# Patient Record
Sex: MALE | Race: WHITE | Employment: FULL TIME | URBAN - METROPOLITAN AREA
[De-identification: names, ages, dates, MRNs, and addresses within clinical notes are randomized per-mention and may not be internally consistent; named-entity substitution may affect disease eponyms.]

---
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
## 2024-10-01 NOTE — PLAN OF CARE
The [address] [place] [address] [facility], [address] [place] [address], [address] [place] [address]  Phone: [phone]   Fax:     [phone]   Physical Therapy Re-Certification Plan of Care    Dear  ,    We had the pleasure of treating the following patient for physical therapy services at [address] [place] [address]. A summary of our findings can be found in the updated assessment below. This includes our plan of care. If you have any questions or concerns regarding these findings, please do not hesitate to contact me at the office phone number checked above. Thank you for the referral.     Physician Signature:________________________________Date:__________________  By signing above (or electronic signature), therapists plan is approved by physician      Overall Response to Treatment:   [x]Patient is responding well to treatment and improvement is noted with regards  to goals   []Patient should continue to improve in reasonable time if they continue HEP   []Patient has plateaued and is no longer responding to skilled PT intervention    []Patient is getting worse and would benefit from return to referring MD   []Patient unable to adhere to initial POC   [x]Other: pt is on track at 3 mo S/P MPFL.   Need to continue to increase strength to allow pt to return to PLOF including sports  in additional 3-6 mo    Date range of previous POC Visits:/2018    Total Visits: 21             Physical Therapy Daily Treatment Note  Date:  2018    Patient Name:  Hector Palacios    :  2005  MRN: [id number]  Restrictions/Precautions:    Medical/Treatment Diagnosis Information:  Diagnosis: L knee instability  Treatment Diagnosis: L knee pain, decrased ROm decreased strength and gait difficulty    Surgical Date:  2018Surgical Procedure:  Left knee arthroscopy with open medial patellofemoral  ligament reconstruction using semitendinosus allograft.    Insurance/Certification information:  PT Insurance Information: [employer]   Physician Information:  Referring Practitioner: Matthew Worley  Plan of care signed (Y/N):     Date of Patient follow up with Physician:     G-Code (if applicable):      Date G-Code Applied:  7/11 28%  PT G-Codes  Functional Assessment Tool Used: LEFS  Score: 28%  Functional Limitation: Mobility: Walking and moving around  Mobility: Walking and Moving Around Current Status (): At least 20 percent but less than 40 percent impaired, limited or restricted  Mobility: Walking and Moving Around Goal Status (): 0 percent impaired, limited or restricted    Progress Note:   Next due by: Visit #20or week of 8/22     Latex Allergy:  [x]NO      []YES  Preferred Language for Healthcare:   [x]English       []other:    Visit # Insurance Allowable   21 7/11      eval 4/24 90     Pain level: 0/10  7/11    SUBJECTIVE:  7/11 no c/o    OBJECTIVE:   Flexibility not assessed  L R Comment   Hamstring         Gastroc         ITB         Quad                                ROM PROM AROM Overpressure Comment     L R L R L R    Flexion     wnl 7/11           Extension 0                                                         Strength L R Comment   Quad Quad good recruitment   7/11   Hamstring  4       Gastroc         Hip  flexion  4+       Hip abd  4+                              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 A muscle stim device  is being prescribed to facilitate strengthening of their quad contraction. This is in accordance with the therapist's established rehabilitation plan to treat quad atrophy. 4/26  Andrea Lam, PT        Reflexes/Sensation:               [x]Dermatomes/Myotomes intact 4/24              []Reflexes equal and normal bilaterally              []Other:     Joint mobility: PF lateral stability noted to be good post surgery 5/24              []Normal               []Hypo 130#  3x10  90# SL ^7/9  ^7/9   Leg extension 3 months PO    Leg curl 3x10 SL 25#  ^7/9        Manual interventions     patellar mobs sup inf  start5/11       Therapeutic Exercise and NMR EXR  [x] (44759) Provided verbal/tactile cueing for activities related to strengthening, flexibility, endurance, ROM for improvements in LE, proximal hip, and core control with self care, mobility, lifting, ambulation. [x] (86867) Provided verbal/tactile cueing for activities related to improving balance, coordination, kinesthetic sense, posture, motor skill, proprioception  to assist with LE, proximal hip, and core control in self care, mobility, lifting, ambulation and eccentric single leg control.      NMR and Therapeutic Activities:    [x] (04292 or 09607) Provided verbal/tactile cueing for activities related to improving balance, coordination, kinesthetic sense, posture, motor skill, proprioception and motor activation to allow for proper function of core, proximal hip and LE with self care and ADLs  [] (09737) Gait Re-education- Provided training and instruction to the patient for proper LE, core and proximal hip recruitment and positioning and eccentric body weight control with ambulation re-education including up and down stairs     Home Exercise Program:    [x] (84049) Reviewed/Progressed HEP activities related to strengthening, flexibility, endurance, ROM of core, proximal hip and LE for functional self-care, mobility, lifting and ambulation/stair navigation   [] (40126)Reviewed/Progressed HEP activities related to improving balance, coordination, kinesthetic sense, posture, motor skill, proprioception of core, proximal hip and LE for self care, mobility, lifting, and ambulation/stair navigation      Manual Treatments:  PROM / STM / Oscillations-Mobs:  G-I, II, III, IV (PA's, Inf., Post.)  [] (28195) Provided manual therapy to mobilize LE, proximal hip and/or LS spine soft tissue/joints for the purpose of modulating pain, [Left] : left ankle [No loss of surgical correlation. Bony alignment acceptable. Hardware in appropriate position] : No loss of surgical correlation. Bony alignment acceptable. Hardware in appropriate position